# Patient Record
Sex: MALE | Race: OTHER | NOT HISPANIC OR LATINO
[De-identification: names, ages, dates, MRNs, and addresses within clinical notes are randomized per-mention and may not be internally consistent; named-entity substitution may affect disease eponyms.]

---

## 2019-12-18 ENCOUNTER — RX RENEWAL (OUTPATIENT)
Age: 84
End: 2019-12-18

## 2019-12-18 PROBLEM — Z00.00 ENCOUNTER FOR PREVENTIVE HEALTH EXAMINATION: Status: ACTIVE | Noted: 2019-12-18

## 2019-12-19 ENCOUNTER — RECORD ABSTRACTING (OUTPATIENT)
Age: 84
End: 2019-12-19

## 2019-12-19 DIAGNOSIS — Z78.9 OTHER SPECIFIED HEALTH STATUS: ICD-10-CM

## 2019-12-19 DIAGNOSIS — Z83.3 FAMILY HISTORY OF DIABETES MELLITUS: ICD-10-CM

## 2019-12-19 DIAGNOSIS — I10 ESSENTIAL (PRIMARY) HYPERTENSION: ICD-10-CM

## 2019-12-19 DIAGNOSIS — Z80.0 FAMILY HISTORY OF MALIGNANT NEOPLASM OF DIGESTIVE ORGANS: ICD-10-CM

## 2019-12-19 DIAGNOSIS — E03.9 HYPOTHYROIDISM, UNSPECIFIED: ICD-10-CM

## 2019-12-19 DIAGNOSIS — Z82.49 FAMILY HISTORY OF ISCHEMIC HEART DISEASE AND OTHER DISEASES OF THE CIRCULATORY SYSTEM: ICD-10-CM

## 2019-12-19 LAB
PSA FREE FLD-MCNC: 20.5
PSA FREE SERPL-MCNC: 1.72
PSA SERPL-MCNC: 8.4

## 2019-12-19 RX ORDER — AMLODIPINE BESYLATE 5 MG/1
TABLET ORAL
Refills: 0 | Status: ACTIVE | COMMUNITY

## 2019-12-19 RX ORDER — LISINOPRIL 30 MG/1
TABLET ORAL
Refills: 0 | Status: ACTIVE | COMMUNITY

## 2019-12-19 RX ORDER — LEVOTHYROXINE SODIUM 137 UG/1
TABLET ORAL
Refills: 0 | Status: ACTIVE | COMMUNITY

## 2020-06-30 ENCOUNTER — RX RENEWAL (OUTPATIENT)
Age: 85
End: 2020-06-30

## 2020-07-02 ENCOUNTER — APPOINTMENT (OUTPATIENT)
Dept: UROLOGY | Facility: CLINIC | Age: 85
End: 2020-07-02
Payer: MEDICARE

## 2020-07-02 DIAGNOSIS — R33.8 OTHER RETENTION OF URINE: ICD-10-CM

## 2020-07-02 DIAGNOSIS — Z86.19 PERSONAL HISTORY OF OTHER INFECTIOUS AND PARASITIC DISEASES: ICD-10-CM

## 2020-07-02 DIAGNOSIS — Z87.898 PERSONAL HISTORY OF OTHER SPECIFIED CONDITIONS: ICD-10-CM

## 2020-07-02 DIAGNOSIS — G47.30 SLEEP APNEA, UNSPECIFIED: ICD-10-CM

## 2020-07-02 DIAGNOSIS — Z86.711 PERSONAL HISTORY OF PULMONARY EMBOLISM: ICD-10-CM

## 2020-07-02 LAB
BILIRUB UR QL STRIP: NORMAL
CLARITY UR: CLEAR
COLLECTION METHOD: NORMAL
GLUCOSE UR-MCNC: NORMAL
HCG UR QL: NORMAL EU/DL
HGB UR QL STRIP.AUTO: NORMAL
KETONES UR-MCNC: NORMAL
LEUKOCYTE ESTERASE UR QL STRIP: NORMAL
NITRITE UR QL STRIP: NORMAL
PH UR STRIP: 5.5
PROT UR STRIP-MCNC: NORMAL
SP GR UR STRIP: 1.02

## 2020-07-02 PROCEDURE — 76857 US EXAM PELVIC LIMITED: CPT

## 2020-07-02 PROCEDURE — 81003 URINALYSIS AUTO W/O SCOPE: CPT | Mod: QW

## 2020-07-02 PROCEDURE — 0: CUSTOM

## 2020-07-02 PROCEDURE — 99215 OFFICE O/P EST HI 40 MIN: CPT | Mod: 25

## 2020-07-02 RX ORDER — APIXABAN 5 MG/1
TABLET, FILM COATED ORAL
Refills: 0 | Status: DISCONTINUED | COMMUNITY
End: 2020-07-02

## 2020-07-02 RX ORDER — ASPIRIN 81 MG
81 TABLET, DELAYED RELEASE (ENTERIC COATED) ORAL
Refills: 0 | Status: ACTIVE | COMMUNITY

## 2020-07-02 NOTE — ASSESSMENT
[FreeTextEntry1] : I discussed the findings and options with Mr. DIANA BOYLE in detail. He will continue with the combination therapy for his voiding symptoms.\par Regarding the ED, he will continue with either Levitra 20mg, Cialis 20mg or re-try Viagra 100mg, independently as he does not want additional intervention.\par \par I would like to see Mr. Boyle, electively, in one year (bladder sono).\par

## 2020-07-02 NOTE — PHYSICAL EXAM
[General Appearance - Well Developed] : well developed [General Appearance - Well Nourished] : well nourished [Normal Appearance] : normal appearance [General Appearance - In No Acute Distress] : no acute distress [Well Groomed] : well groomed [Costovertebral Angle Tenderness] : no ~M costovertebral angle tenderness [Abdomen Soft] : soft [Abdomen Tenderness] : non-tender [Urinary Bladder Findings] : the bladder was normal on palpation [Urethral Meatus] : meatus normal [Testes Mass (___cm)] : there were no testicular masses [Scrotum] : the scrotum was normal [No Prostate Nodules] : no prostate nodules [Skin Color & Pigmentation] : normal skin color and pigmentation [Edema] : no peripheral edema [] : no respiratory distress [Exaggerated Use Of Accessory Muscles For Inspiration] : no accessory muscle use [Oriented To Time, Place, And Person] : oriented to person, place, and time [Respiration, Rhythm And Depth] : normal respiratory rhythm and effort [Mood] : the mood was normal [Affect] : the affect was normal [Normal Station and Gait] : the gait and station were normal for the patient's age [Not Anxious] : not anxious [No Focal Deficits] : no focal deficits [No Palpable Adenopathy] : no palpable adenopathy [Abdomen Mass (___ Cm)] : no abdominal mass palpated [Penis Abnormality] : normal circumcised penis [Epididymis] : the epididymides were normal [Prostate Tenderness] : the prostate was not tender [Testes Tenderness] : no tenderness of the testes [Heart Rate And Rhythm] : Heart rate and rhythm were normal [FreeTextEntry1] : Diffuse angiokeratomas and seborrheic keratoses

## 2020-07-02 NOTE — HISTORY OF PRESENT ILLNESS
[FreeTextEntry1] : Mr. DIANA BOYLE comes in today for his urologic follow-up.  He presents with longstanding moderate chronic lower urinary tract symptoms (obstructive and irritative) with nocturia x1. He has had infrequent episodes of urge incontinence.  He is continuing on Uroxatrol and Proscar.  Mr. Boyle experienced urinary retention after having an inguinal herniorrhaphy in October 2018 and was managed with clean intermittent catheterization for several months.\par IPSS: 7/35\par Sono: 24cc PVR; 125cc prostate\par \par Mr. Boyle a long history of erectile dysfunction managed with Levitra 20 mg or Cialis 20 mg which he uses independently, achieving a suboptimal response. 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

## 2020-07-02 NOTE — LETTER BODY
[Dear  ___] : Dear  [unfilled], [Please see my note below.] : Please see my note below. [Consult Closing:] : Thank you very much for allowing me to participate in the care of this patient.  If you have any questions, please do not hesitate to contact me. [Sincerely,] : Sincerely, [FreeTextEntry3] : Padilla Fernandez MD, FACS\par

## 2020-12-28 ENCOUNTER — NON-APPOINTMENT (OUTPATIENT)
Age: 85
End: 2020-12-28

## 2021-01-21 ENCOUNTER — APPOINTMENT (OUTPATIENT)
Dept: UROLOGY | Facility: CLINIC | Age: 86
End: 2021-01-21
Payer: MEDICARE

## 2021-01-21 VITALS
TEMPERATURE: 97.6 F | HEIGHT: 67 IN | SYSTOLIC BLOOD PRESSURE: 120 MMHG | DIASTOLIC BLOOD PRESSURE: 77 MMHG | HEART RATE: 77 BPM | BODY MASS INDEX: 27.47 KG/M2 | OXYGEN SATURATION: 98 % | WEIGHT: 175 LBS

## 2021-01-21 LAB
BILIRUB UR QL STRIP: NORMAL
CLARITY UR: CLEAR
COLLECTION METHOD: NORMAL
GLUCOSE UR-MCNC: NORMAL
HCG UR QL: 0.2 EU/DL
HGB UR QL STRIP.AUTO: NORMAL
KETONES UR-MCNC: NORMAL
LEUKOCYTE ESTERASE UR QL STRIP: NORMAL
NITRITE UR QL STRIP: NORMAL
PH UR STRIP: 6.5
PROT UR STRIP-MCNC: NORMAL
SP GR UR STRIP: 1.02

## 2021-01-21 PROCEDURE — 99215 OFFICE O/P EST HI 40 MIN: CPT | Mod: 25

## 2021-01-21 PROCEDURE — 81003 URINALYSIS AUTO W/O SCOPE: CPT | Mod: QW

## 2021-01-21 PROCEDURE — 76857 US EXAM PELVIC LIMITED: CPT

## 2021-01-21 PROCEDURE — 99072 ADDL SUPL MATRL&STAF TM PHE: CPT

## 2021-01-21 RX ORDER — SILDENAFIL 100 MG/1
100 TABLET, FILM COATED ORAL
Qty: 10 | Refills: 3 | Status: ACTIVE | COMMUNITY
Start: 2021-01-21 | End: 1900-01-01

## 2021-01-21 RX ORDER — VARDENAFIL 20 MG/1
20 TABLET, FILM COATED ORAL
Qty: 10 | Refills: 6 | Status: DISCONTINUED | COMMUNITY
End: 2021-01-21

## 2021-01-21 RX ORDER — TADALAFIL 20 MG/1
20 TABLET ORAL
Qty: 10 | Refills: 5 | Status: DISCONTINUED | COMMUNITY
End: 2021-01-21

## 2021-01-21 NOTE — ASSESSMENT
[FreeTextEntry1] : I discussed the findings and options with Mr. DIANA BOYLE in detail. He will continue with alfuzosin and finasteride.\par \par Regarding the ED, he will try Viagra 100-150mg. I counselled him regarding its use and side effects.\par \par Providing there are no new problems, I look forward to seeing Mr. Boyle in one year (bladder sono). \par

## 2021-01-21 NOTE — PHYSICAL EXAM
[General Appearance - Well Developed] : well developed [General Appearance - Well Nourished] : well nourished [Normal Appearance] : normal appearance [Well Groomed] : well groomed [General Appearance - In No Acute Distress] : no acute distress [Abdomen Soft] : soft [Abdomen Tenderness] : non-tender [Costovertebral Angle Tenderness] : no ~M costovertebral angle tenderness [Abdomen Mass (___ Cm)] : no abdominal mass palpated [Urethral Meatus] : meatus normal [Penis Abnormality] : normal circumcised penis [Urinary Bladder Findings] : the bladder was normal on palpation [Scrotum] : the scrotum was normal [Epididymis] : the epididymides were normal [Testes Tenderness] : no tenderness of the testes [Prostate Tenderness] : the prostate was not tender [Testes Mass (___cm)] : there were no testicular masses [No Prostate Nodules] : no prostate nodules [Skin Color & Pigmentation] : normal skin color and pigmentation [Heart Rate And Rhythm] : Heart rate and rhythm were normal [Edema] : no peripheral edema [] : no respiratory distress [Respiration, Rhythm And Depth] : normal respiratory rhythm and effort [Exaggerated Use Of Accessory Muscles For Inspiration] : no accessory muscle use [Oriented To Time, Place, And Person] : oriented to person, place, and time [Affect] : the affect was normal [Mood] : the mood was normal [Not Anxious] : not anxious [Normal Station and Gait] : the gait and station were normal for the patient's age [No Focal Deficits] : no focal deficits [No Palpable Adenopathy] : no palpable adenopathy [FreeTextEntry1] : Diffuse angiokeratomas and seborrheic keratoses

## 2021-01-21 NOTE — HISTORY OF PRESENT ILLNESS
[FreeTextEntry1] : Mr. DIANA BOYLE comes in today for follow-up.  He presents with longstanding moderate chronic lower urinary tract symptoms (obstructive and irritative) with nocturia x1. He has also had infrequent episodes of urge incontinence.  He is continuing on Uroxatral and Proscar.  Mr. Boyle experienced urinary retention after having an inguinal herniorrhaphy in October 2018, which was managed with clean intermittent catheterization for several months. \par IPSS: 4/35\par Sono:  53cc PVR; 90cc prostate\par \par Mr. Boyle also has a long history of erectile dysfunction managed with Levitra 20 mg or Cialis 20 mg which he uses independently, with a suboptimal response. He has also noted decreased libido.\par \par PSAs: 6/17/15--8.4 (20%); 6/16/14--7.61 (23%); 6/24/13--9.27; 11/12/12--18.56; 11/17/11--7.89\par \par Prostate bx:  11/28/17--PIN; 1/27/13--BPH; 8/26/10--BPH; 10/17/08--BPH; 9/4/02--PIN

## 2021-01-21 NOTE — ADDENDUM
[FreeTextEntry1] : A portion of this note was written by [Titi Bañuelos] on 01/15/2021 acting as a scribe for Dr. Fernandez. \par \par I have personally reviewed the chart and agree that the record accurately reflects my personal performance of the history, physical exam, assessment, and plan.

## 2021-03-12 ENCOUNTER — LABORATORY RESULT (OUTPATIENT)
Age: 86
End: 2021-03-12

## 2021-03-12 ENCOUNTER — APPOINTMENT (OUTPATIENT)
Dept: UROLOGY | Facility: CLINIC | Age: 86
End: 2021-03-12
Payer: MEDICARE

## 2021-03-12 VITALS
OXYGEN SATURATION: 93 % | BODY MASS INDEX: 27.47 KG/M2 | TEMPERATURE: 98.4 F | HEIGHT: 67 IN | DIASTOLIC BLOOD PRESSURE: 69 MMHG | HEART RATE: 75 BPM | SYSTOLIC BLOOD PRESSURE: 132 MMHG | WEIGHT: 175 LBS

## 2021-03-12 LAB
BILIRUB UR QL STRIP: NORMAL
CLARITY UR: CLEAR
COLLECTION METHOD: NORMAL
GLUCOSE UR-MCNC: NORMAL
HCG UR QL: 0.2 EU/DL
HGB UR QL STRIP.AUTO: NORMAL
KETONES UR-MCNC: NORMAL
LEUKOCYTE ESTERASE UR QL STRIP: NORMAL
NITRITE UR QL STRIP: NORMAL
PH UR STRIP: 5.5
PROT UR STRIP-MCNC: NORMAL
SP GR UR STRIP: 1.03

## 2021-03-12 PROCEDURE — 99214 OFFICE O/P EST MOD 30 MIN: CPT

## 2021-03-12 PROCEDURE — 81003 URINALYSIS AUTO W/O SCOPE: CPT | Mod: QW

## 2021-03-12 PROCEDURE — 99072 ADDL SUPL MATRL&STAF TM PHE: CPT

## 2021-03-12 NOTE — PHYSICAL EXAM
[General Appearance - Well Developed] : well developed [General Appearance - Well Nourished] : well nourished [Normal Appearance] : normal appearance [Well Groomed] : well groomed [General Appearance - In No Acute Distress] : no acute distress [Abdomen Soft] : soft [Abdomen Tenderness] : non-tender [Abdomen Mass (___ Cm)] : no abdominal mass palpated [Costovertebral Angle Tenderness] : no ~M costovertebral angle tenderness [Urethral Meatus] : meatus normal [Penis Abnormality] : normal circumcised penis [Urinary Bladder Findings] : the bladder was normal on palpation [Scrotum] : the scrotum was normal [Epididymis] : the epididymides were normal [Testes Tenderness] : no tenderness of the testes [Testes Mass (___cm)] : there were no testicular masses [Skin Color & Pigmentation] : normal skin color and pigmentation [Heart Rate And Rhythm] : Heart rate and rhythm were normal [Edema] : no peripheral edema [] : no respiratory distress [Respiration, Rhythm And Depth] : normal respiratory rhythm and effort [Exaggerated Use Of Accessory Muscles For Inspiration] : no accessory muscle use [Oriented To Time, Place, And Person] : oriented to person, place, and time [Affect] : the affect was normal [Mood] : the mood was normal [Not Anxious] : not anxious [Normal Station and Gait] : the gait and station were normal for the patient's age [No Focal Deficits] : no focal deficits [No Palpable Adenopathy] : no palpable adenopathy [FreeTextEntry1] : Diffuse angiokeratomas and seborrheic keratoses

## 2021-03-12 NOTE — ASSESSMENT
[FreeTextEntry1] : I discussed the findings and options with Mr. DIANA BOYLE in detail. 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  He will monitor this and call us if there is a recurrence.\par \par In the meantime, Mr. Boyle will continue with the combination therapy for his voiding symptoms and Viagra for the erectile dysfunction.\par \par I explained to him that the absent ejaculation is a consequence of the medications he is on as well as his age, and that there is no effective intervention for this.\par \par Providing there are no new problems, I look forward to seeing Mr. Boyle in one year (bladder sono). \par

## 2021-03-12 NOTE — ADDENDUM
[FreeTextEntry1] : A portion of this note was written by [Titi Bañuelos] on 03/11/2021 acting as a scribe for Dr. Fernanedz. \par \par I have personally reviewed the chart and agree that the record accurately reflects my personal performance of the history, physical exam, assessment, and plan.

## 2021-03-12 NOTE — HISTORY OF PRESENT ILLNESS
[FreeTextEntry1] : Mr. DIANA BOYLE comes in emergently today for follow-up noting blood on his underpants yesterday.  He denies any hematuria or hematochezia.  Additionally, there has not been any change in his voiding symptoms.\par \par From his general urologic history, Mr. Boyle reports longstanding moderate chronic lower urinary tract symptoms (obstructive and irritative) with nocturia x1. He has also had infrequent episodes of urge incontinence.  He is continuing on Uroxatral and Proscar.  \par IPSS: 5/35\par Sono: 18cc PVR; Prostate 90cc\par \par Mr. Boyle experienced urinary retention after having an inguinal herniorrhaphy in October 2018, which was managed with clean intermittent catheterization for several months. \par \par He has also a long history of erectile dysfunction. He has tried several PDE 5 inhibitors with a suboptimal response.  On his last visit I gave him Viagra 100mg which she has not tried.  He has also noted decreased libido and negligible ejaculatory volume.\par \par PSAs: 6/17/15--8.4 (20%); 6/16/14--7.61 (23%); 6/24/13--9.27; 11/12/12--18.56; 11/17/11--7.89\par \par Prostate bx:  11/28/17--PIN; 1/27/13--BPH; 8/26/10--BPH; 10/17/08--BPH; 9/4/02--PIN

## 2021-03-18 ENCOUNTER — NON-APPOINTMENT (OUTPATIENT)
Age: 86
End: 2021-03-18

## 2021-04-02 LAB
BACTERIA UR CULT: NORMAL
URINE CYTOLOGY: NORMAL

## 2021-04-08 ENCOUNTER — NON-APPOINTMENT (OUTPATIENT)
Age: 86
End: 2021-04-08

## 2021-06-18 ENCOUNTER — APPOINTMENT (OUTPATIENT)
Dept: UROLOGY | Facility: CLINIC | Age: 86
End: 2021-06-18
Payer: MEDICARE

## 2021-06-18 VITALS
DIASTOLIC BLOOD PRESSURE: 80 MMHG | WEIGHT: 175 LBS | HEIGHT: 67 IN | TEMPERATURE: 97.5 F | SYSTOLIC BLOOD PRESSURE: 146 MMHG | BODY MASS INDEX: 27.47 KG/M2 | HEART RATE: 68 BPM

## 2021-06-18 LAB
BILIRUB UR QL STRIP: NORMAL
CLARITY UR: CLEAR
COLLECTION METHOD: NORMAL
GLUCOSE UR-MCNC: NORMAL
HCG UR QL: 0.2 EU/DL
HGB UR QL STRIP.AUTO: NORMAL
KETONES UR-MCNC: NORMAL
LEUKOCYTE ESTERASE UR QL STRIP: NORMAL
NITRITE UR QL STRIP: NORMAL
PH UR STRIP: 5.5
PROT UR STRIP-MCNC: NORMAL
SP GR UR STRIP: 1.02

## 2021-06-18 PROCEDURE — 76857 US EXAM PELVIC LIMITED: CPT

## 2021-06-18 PROCEDURE — 99215 OFFICE O/P EST HI 40 MIN: CPT

## 2021-06-18 PROCEDURE — 99072 ADDL SUPL MATRL&STAF TM PHE: CPT

## 2021-06-18 RX ORDER — CIPROFLOXACIN HYDROCHLORIDE 500 MG/1
500 TABLET, FILM COATED ORAL TWICE DAILY
Qty: 10 | Refills: 0 | Status: DISCONTINUED | COMMUNITY
Start: 2021-03-18 | End: 2021-06-18

## 2021-06-18 NOTE — ADDENDUM
[FreeTextEntry1] : A portion of this note was written by [Titi Bañuelos] on 06/17/2021 acting as a scribe for Dr. Fernandez. \par \par I have personally reviewed the chart and agree that the record accurately reflects my personal performance of the history, physical exam, assessment, and plan.

## 2021-06-18 NOTE — HISTORY OF PRESENT ILLNESS
[FreeTextEntry1] : Mr. DIANA BOYLE comes in today for his urologic follow-up. He reports longstanding moderate stable chronic lower urinary tract symptoms (obstructive and irritative) with nocturia x 1. He has also had infrequent episodes of urge incontinence.  He is continuing on Uroxatral and Proscar.  \par IPSS: 8/35\par Sono: 19cc PVR; 112cc prostate\par \par Mr. Boyle experienced urinary retention after having an inguinal herniorrhaphy in October 2018, which was managed with clean intermittent catheterization for several months. \par \par He has also a long history of erectile dysfunction. He has tried several PDE 5 inhibitors with a suboptimal response.  He still has Viagra 100mg, which he has not tried.  He has also continues to experience decreased libido and negligible ejaculatory volume.\par \par PSAs: 6/17/15--8.4 (20%); 6/16/14--7.61 (23%); 6/24/13--9.27; 11/12/12--18.56; 11/17/11--7.89\par \par Prostate bx:  11/28/17--PIN; 1/27/13--BPH; 8/26/10--BPH; 10/17/08--BPH; 9/4/02--PIN

## 2021-06-18 NOTE — ASSESSMENT
[FreeTextEntry1] : I discussed the findings and options with Mr. DIANA BOYLE in detail. He will continue with combination therapy for his voiding symptoms the Viagra for ED prn.\par \par Providing there are no new problems, I look forward to seeing Mr. Boyle in one year (bladder sono). \par

## 2021-06-18 NOTE — PHYSICAL EXAM
[General Appearance - Well Developed] : well developed [General Appearance - Well Nourished] : well nourished [Normal Appearance] : normal appearance [Well Groomed] : well groomed [General Appearance - In No Acute Distress] : no acute distress [Abdomen Soft] : soft [Abdomen Tenderness] : non-tender [Abdomen Mass (___ Cm)] : no abdominal mass palpated [Costovertebral Angle Tenderness] : no ~M costovertebral angle tenderness [Urethral Meatus] : meatus normal [Penis Abnormality] : normal circumcised penis [Urinary Bladder Findings] : the bladder was normal on palpation [Scrotum] : the scrotum was normal [Epididymis] : the epididymides were normal [Testes Tenderness] : no tenderness of the testes [Testes Mass (___cm)] : there were no testicular masses [Skin Color & Pigmentation] : normal skin color and pigmentation [Heart Rate And Rhythm] : Heart rate and rhythm were normal [Edema] : no peripheral edema [] : no respiratory distress [Respiration, Rhythm And Depth] : normal respiratory rhythm and effort [Exaggerated Use Of Accessory Muscles For Inspiration] : no accessory muscle use [Oriented To Time, Place, And Person] : oriented to person, place, and time [Mood] : the mood was normal [Affect] : the affect was normal [Not Anxious] : not anxious [Normal Station and Gait] : the gait and station were normal for the patient's age [No Focal Deficits] : no focal deficits [No Palpable Adenopathy] : no palpable adenopathy [Prostate Tenderness] : the prostate was not tender [No Prostate Nodules] : no prostate nodules [FreeTextEntry1] : Diffuse angiokeratomas and seborrheic keratoses

## 2021-11-12 ENCOUNTER — NON-APPOINTMENT (OUTPATIENT)
Age: 86
End: 2021-11-12

## 2022-02-08 ENCOUNTER — APPOINTMENT (OUTPATIENT)
Dept: UROLOGY | Facility: CLINIC | Age: 87
End: 2022-02-08
Payer: MEDICARE

## 2022-02-08 PROCEDURE — 81003 URINALYSIS AUTO W/O SCOPE: CPT | Mod: QW

## 2022-02-08 PROCEDURE — 99214 OFFICE O/P EST MOD 30 MIN: CPT

## 2022-02-08 NOTE — PHYSICAL EXAM
[General Appearance - Well Developed] : well developed [General Appearance - Well Nourished] : well nourished [Normal Appearance] : normal appearance [Well Groomed] : well groomed [General Appearance - In No Acute Distress] : no acute distress [Abdomen Soft] : soft [Abdomen Tenderness] : non-tender [Abdomen Mass (___ Cm)] : no abdominal mass palpated [Costovertebral Angle Tenderness] : no ~M costovertebral angle tenderness [Urethral Meatus] : meatus normal [Penis Abnormality] : normal circumcised penis [Urinary Bladder Findings] : the bladder was normal on palpation [Scrotum] : the scrotum was normal [Epididymis] : the epididymides were normal [Testes Tenderness] : no tenderness of the testes [Testes Mass (___cm)] : there were no testicular masses [Prostate Tenderness] : the prostate was not tender [No Prostate Nodules] : no prostate nodules [Skin Color & Pigmentation] : normal skin color and pigmentation [Heart Rate And Rhythm] : Heart rate and rhythm were normal [Edema] : no peripheral edema [] : no respiratory distress [Respiration, Rhythm And Depth] : normal respiratory rhythm and effort [Exaggerated Use Of Accessory Muscles For Inspiration] : no accessory muscle use [Oriented To Time, Place, And Person] : oriented to person, place, and time [Affect] : the affect was normal [Mood] : the mood was normal [Not Anxious] : not anxious [Normal Station and Gait] : the gait and station were normal for the patient's age [No Focal Deficits] : no focal deficits [No Palpable Adenopathy] : no palpable adenopathy [FreeTextEntry1] : Diffuse angiokeratomas and seborrheic keratoses

## 2022-02-08 NOTE — HISTORY OF PRESENT ILLNESS
[FreeTextEntry1] : Mr. DIANA BOYLE comes in today for his urologic follow-up. He reports longstanding moderate stable chronic lower urinary tract symptoms (obstructive and irritative) with nocturia x 1. He has also had infrequent episodes of urge incontinence.  He is continuing on Uroxatral and Proscar.  \par IPSS: 10/35\par Sono:  26cc PVR; 146cc prostate\par \par Mr. Boyle experienced urinary retention after having an inguinal herniorrhaphy in October 2018, which was managed with clean intermittent catheterization for several months. \par \par He has also a long history of erectile dysfunction. He has tried several PDE 5 inhibitors with a suboptimal response.  He still has Viagra 100mg, which he has not tried.  He has also continues to experience decreased libido and negligible ejaculatory volume.\par \par PSAs: 6/17/15--8.4 (20%); 6/16/14--7.61 (23%); 6/24/13--9.27; 11/12/12--18.56; 11/17/11--7.89\par \par Prostate bx:  11/28/17--PIN; 1/27/13--BPH; 8/26/10--BPH; 10/17/08--BPH; 9/4/02--PIN

## 2022-02-08 NOTE — ASSESSMENT
[FreeTextEntry1] : I discussed the findings and options with Mr. DIANA BOYLE in detail. He will continue with combination therapy for his voiding symptoms the Viagra for ED prn.\par \par The right inguinal and umbilical hernias have increased in size although he has remained asymptomatic.  He is contemplating surgery for these.\par \par Providing there are no new problems, I look forward to seeing Mr. Boyle in one year (bladder sono). \par

## 2022-02-08 NOTE — ADDENDUM
[FreeTextEntry1] : A portion of this note was written by [Titi Bañuelos] on 02/07/2022 acting as a scribe for Dr. Fernandez. \par \par I have personally reviewed the chart and agree that the record accurately reflects my personal performance of the history, physical exam, assessment, and plan.

## 2022-08-30 ENCOUNTER — APPOINTMENT (OUTPATIENT)
Dept: UROLOGY | Facility: CLINIC | Age: 87
End: 2022-08-30

## 2022-08-30 VITALS
HEART RATE: 60 BPM | WEIGHT: 173 LBS | DIASTOLIC BLOOD PRESSURE: 75 MMHG | OXYGEN SATURATION: 96 % | BODY MASS INDEX: 27.47 KG/M2 | TEMPERATURE: 98.2 F | HEIGHT: 66.5 IN | SYSTOLIC BLOOD PRESSURE: 118 MMHG

## 2022-08-30 DIAGNOSIS — Z87.440 PERSONAL HISTORY OF URINARY (TRACT) INFECTIONS: ICD-10-CM

## 2022-08-30 PROCEDURE — 76857 US EXAM PELVIC LIMITED: CPT

## 2022-08-30 NOTE — ADDENDUM
[FreeTextEntry1] : A portion of this note was written by [Titi Bañuelos] on 08/29/2022 acting as a scribe for Dr. Fernandez. \par \par I have personally reviewed the chart and agree that the record accurately reflects my personal performance of the history, physical exam, assessment, and plan.

## 2022-08-30 NOTE — PHYSICAL EXAM
[General Appearance - Well Developed] : well developed [General Appearance - Well Nourished] : well nourished [Normal Appearance] : normal appearance [Well Groomed] : well groomed [General Appearance - In No Acute Distress] : no acute distress [Abdomen Soft] : soft [Abdomen Tenderness] : non-tender [Abdomen Mass (___ Cm)] : no abdominal mass palpated [Costovertebral Angle Tenderness] : no ~M costovertebral angle tenderness [Urethral Meatus] : meatus normal [Penis Abnormality] : normal circumcised penis [Urinary Bladder Findings] : the bladder was normal on palpation [Scrotum] : the scrotum was normal [Epididymis] : the epididymides were normal [Testes Tenderness] : no tenderness of the testes [Testes Mass (___cm)] : there were no testicular masses [Prostate Tenderness] : the prostate was not tender [No Prostate Nodules] : no prostate nodules [Skin Color & Pigmentation] : normal skin color and pigmentation [Heart Rate And Rhythm] : Heart rate and rhythm were normal [Edema] : no peripheral edema [] : no respiratory distress [Respiration, Rhythm And Depth] : normal respiratory rhythm and effort [Exaggerated Use Of Accessory Muscles For Inspiration] : no accessory muscle use [Oriented To Time, Place, And Person] : oriented to person, place, and time [Affect] : the affect was normal [Mood] : the mood was normal [Not Anxious] : not anxious [Normal Station and Gait] : the gait and station were normal for the patient's age [No Focal Deficits] : no focal deficits [No Palpable Adenopathy] : no palpable adenopathy [Abdomen Hernia] : no hernia was discovered [FreeTextEntry1] : Diffuse angiokeratomas and seborrheic keratoses

## 2022-08-30 NOTE — HISTORY OF PRESENT ILLNESS
[FreeTextEntry1] : Mr. DIANA BOYLE comes in today for his urologic follow-up. He underwent right inguinal and umbilical herniorrhaphies in early April 2022.  Postoperatively he developed, what he refers to as, significant scrotal swelling which has improved markedly.  \par \par From his general urologic history Mr. Boyle reports longstanding moderate stable chronic lower urinary tract symptoms (obstructive and irritative) with nocturia x 1. He has also had infrequent episodes of urge incontinence.  He is continuing on Uroxatral and Proscar and is satisfied with this approach.\par IPSS: 7\par Sono: PVR 9cc, 109 cc prostate\par \par Mr. Boyle experienced urinary retention after having an inguinal herniorrhaphy in October 2018, which was managed with clean intermittent catheterization for several months. \par \par He has also a long history of erectile dysfunction. He has tried several PDE 5 inhibitors with a suboptimal response.  He still has Viagra 100mg, which he has not tried.  He has also continues to experience decreased libido and negligible ejaculatory volume.\par \par PSAs: 6/17/15--8.4 (20%); 6/16/14--7.61 (23%); 6/24/13--9.27; 11/12/12--18.56; 11/17/11--7.89\par \par Prostate bx:  11/28/17--PIN; 1/27/13--BPH; 8/26/10--BPH; 10/17/08--BPH; 9/4/02--PIN\par \par Scrotal US: 7/21/22--Left varicocele. Bilateral hydroceles. 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

## 2023-01-29 ENCOUNTER — RX RENEWAL (OUTPATIENT)
Age: 88
End: 2023-01-29

## 2023-02-06 ENCOUNTER — APPOINTMENT (OUTPATIENT)
Dept: UROLOGY | Facility: CLINIC | Age: 88
End: 2023-02-06
Payer: MEDICARE

## 2023-02-06 VITALS
OXYGEN SATURATION: 98 % | HEART RATE: 68 BPM | TEMPERATURE: 97.1 F | DIASTOLIC BLOOD PRESSURE: 74 MMHG | SYSTOLIC BLOOD PRESSURE: 131 MMHG

## 2023-02-06 DIAGNOSIS — Z87.19 PERSONAL HISTORY OF OTHER DISEASES OF THE DIGESTIVE SYSTEM: ICD-10-CM

## 2023-02-06 PROCEDURE — 51798 US URINE CAPACITY MEASURE: CPT

## 2023-02-06 PROCEDURE — 99215 OFFICE O/P EST HI 40 MIN: CPT

## 2023-02-06 NOTE — HISTORY OF PRESENT ILLNESS
[FreeTextEntry1] : Mr. DIANA BOYLE comes in today for his urologic follow-up. He reports longstanding moderate stable chronic lower urinary tract symptoms (obstructive and irritative) with nocturia x 1. He has also had infrequent episodes of urge incontinence.  He is continuing on Uroxatral and Proscar and is satisfied with this approach.\par IPSS: 7/35\par Sono (performed to assess bladder emptying): 12cc PVR\par \par He has also a long history of erectile dysfunction. He has tried several PDE 5 inhibitors with a suboptimal response, including Viagra 100mg. He has also continues to experience decreased libido and negligible ejaculatory volume.\par \par Mr. Boyle experienced urinary retention after having an inguinal herniorrhaphy in October 2018, which was managed with clean intermittent catheterization for several months. \par \par He underwent right inguinal and umbilical herniorrhaphies in early April 2022.  Postoperatively he developed, what he refers to as, significant scrotal swelling which has resolved.\par \par PSAs: 6/17/15--8.4 (20%); 6/16/14--7.61 (23%); 6/24/13--9.27; 11/12/12--18.56; 11/17/11--7.89\par \par Prostate bx:  11/28/17--PIN; 1/27/13--BPH; 8/26/10--BPH; 10/17/08--BPH; 9/4/02--PIN\par \par Scrotal US: 7/21/22--Left varicocele. Bilateral hydroceles. 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

## 2023-02-06 NOTE — PHYSICAL EXAM
[General Appearance - Well Developed] : well developed [General Appearance - Well Nourished] : well nourished [Normal Appearance] : normal appearance [Well Groomed] : well groomed [General Appearance - In No Acute Distress] : no acute distress [Abdomen Soft] : soft [Abdomen Tenderness] : non-tender [Abdomen Mass (___ Cm)] : no abdominal mass palpated [Abdomen Hernia] : no hernia was discovered [Costovertebral Angle Tenderness] : no ~M costovertebral angle tenderness [Urethral Meatus] : meatus normal [Penis Abnormality] : normal circumcised penis [Urinary Bladder Findings] : the bladder was normal on palpation [Scrotum] : the scrotum was normal [Epididymis] : the epididymides were normal [Testes Tenderness] : no tenderness of the testes [Testes Mass (___cm)] : there were no testicular masses [Prostate Tenderness] : the prostate was not tender [No Prostate Nodules] : no prostate nodules [Skin Color & Pigmentation] : normal skin color and pigmentation [Edema] : no peripheral edema [Heart Rate And Rhythm] : Heart rate and rhythm were normal [] : no respiratory distress [Respiration, Rhythm And Depth] : normal respiratory rhythm and effort [Exaggerated Use Of Accessory Muscles For Inspiration] : no accessory muscle use [Oriented To Time, Place, And Person] : oriented to person, place, and time [Affect] : the affect was normal [Mood] : the mood was normal [Not Anxious] : not anxious [Normal Station and Gait] : the gait and station were normal for the patient's age [No Focal Deficits] : no focal deficits [No Palpable Adenopathy] : no palpable adenopathy [FreeTextEntry1] : Diffuse angiokeratomas and seborrheic keratoses

## 2023-02-06 NOTE — ADDENDUM
[FreeTextEntry1] : A portion of this note was written by [Titi Bañuelos] on 02/03/2023 acting as a scribe for Dr. Fernandez. \par \par I have personally reviewed the chart and agree that the record accurately reflects my personal performance of the history, physical exam, assessment, and plan.

## 2023-02-06 NOTE — ASSESSMENT
[FreeTextEntry1] : I discussed the findings and options with Mr. DIANA BOYLE in detail. He will continue with combination therapy for his voiding symptoms the Viagra for ED prn.  He is not interested in pursuing invasive treatment for the ED (i.e. penile injection therapy or a penile prosthesis.\par \par Providing there are no new problems, I look forward to seeing Mr. Boyle in one year (bladder sono). \par

## 2024-02-02 PROBLEM — R35.1 NOCTURIA: Status: ACTIVE | Noted: 2020-07-02

## 2024-02-02 PROBLEM — N52.9 ERECTILE DYSFUNCTION: Status: ACTIVE | Noted: 2019-12-19

## 2024-02-02 PROBLEM — N39.41 URGE INCONTINENCE: Status: ACTIVE | Noted: 2020-07-02

## 2024-02-02 PROBLEM — R39.9 LOWER URINARY TRACT SYMPTOMS (LUTS): Status: ACTIVE | Noted: 2019-12-18

## 2024-02-05 ENCOUNTER — APPOINTMENT (OUTPATIENT)
Dept: UROLOGY | Facility: CLINIC | Age: 89
End: 2024-02-05
Payer: MEDICARE

## 2024-02-05 DIAGNOSIS — R39.9 UNSPECIFIED SYMPTOMS AND SIGNS INVOLVING THE GENITOURINARY SYSTEM: ICD-10-CM

## 2024-02-05 DIAGNOSIS — N52.9 MALE ERECTILE DYSFUNCTION, UNSPECIFIED: ICD-10-CM

## 2024-02-05 DIAGNOSIS — N39.41 URGE INCONTINENCE: ICD-10-CM

## 2024-02-05 DIAGNOSIS — R35.1 NOCTURIA: ICD-10-CM

## 2024-02-05 PROCEDURE — 51798 US URINE CAPACITY MEASURE: CPT

## 2024-02-05 PROCEDURE — 99215 OFFICE O/P EST HI 40 MIN: CPT

## 2024-02-05 RX ORDER — SILDENAFIL 100 MG/1
100 TABLET, FILM COATED ORAL
Qty: 30 | Refills: 3 | Status: ACTIVE | COMMUNITY
Start: 1900-01-01 | End: 1900-01-01

## 2024-02-05 RX ORDER — FINASTERIDE 5 MG/1
5 TABLET, FILM COATED ORAL
Qty: 90 | Refills: 3 | Status: ACTIVE | COMMUNITY
Start: 2019-12-18 | End: 1900-01-01

## 2024-02-05 RX ORDER — ALFUZOSIN HYDROCHLORIDE 10 MG/1
10 TABLET, EXTENDED RELEASE ORAL
Qty: 90 | Refills: 3 | Status: ACTIVE | COMMUNITY
Start: 2019-12-18 | End: 1900-01-01

## 2024-02-05 NOTE — HISTORY OF PRESENT ILLNESS
[FreeTextEntry1] : Mr. DIANA BOYLE returns for his annual follow-up. He reports longstanding moderate stable chronic lower urinary tract symptoms (obstructive and irritative) with nocturia x 1. He has also had infrequent episodes of urge incontinence.  He is continuing on Uroxatral and Proscar and is satisfied with this approach. IPSS: 8/1 Sono (performed to assess bladder emptying): 3cc PVR  He has also a long history of erectile dysfunction. He has tried several PDE 5 inhibitors with a suboptimal response, including Viagra 100mg. He has also continues to experience decreased libido and negligible ejaculatory volume.  Mr. Boyle experienced urinary retention after having an inguinal herniorrhaphy in October 2018, which was managed with clean intermittent catheterization for several months.   He underwent right inguinal and umbilical herniorrhaphies in early April 2022.  Postoperatively he developed, what he refers to as, significant scrotal swelling which has resolved.  PSAs: 6/17/15--8.4 (20%); 6/16/14--7.61 (23%); 6/24/13--9.27; 11/12/12--18.56; 11/17/11--7.89  Prostate bx:  11/28/17--PIN; 1/27/13--BPH; 8/26/10--BPH; 10/17/08--BPH; 9/4/02--PIN  Scrotal US: 7/21/22--Left varicocele. Bilateral hydroceles. 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

## 2024-02-05 NOTE — ASSESSMENT
[FreeTextEntry1] : I discussed the findings and options with Mr. DIANA BOYLE in detail. He will continue with combination therapy for his voiding symptoms and Viagra for ED prn.  He is not interested in pursuing invasive treatment for the ED (i.e. penile injection therapy or a penile prosthesis).  Providing there are no new problems, I look forward to seeing Mr. Boyle in one year (bladder sono).

## 2024-02-05 NOTE — PHYSICAL EXAM
[General Appearance - Well Developed] : well developed [General Appearance - Well Nourished] : well nourished [Normal Appearance] : normal appearance [Well Groomed] : well groomed [General Appearance - In No Acute Distress] : no acute distress [Abdomen Soft] : soft [Abdomen Tenderness] : non-tender [Abdomen Mass (___ Cm)] : no abdominal mass palpated [Abdomen Hernia] : no hernia was discovered [Costovertebral Angle Tenderness] : no ~M costovertebral angle tenderness [Urethral Meatus] : meatus normal [Penis Abnormality] : normal circumcised penis [Urinary Bladder Findings] : the bladder was normal on palpation [Scrotum] : the scrotum was normal [Epididymis] : the epididymides were normal [Testes Tenderness] : no tenderness of the testes [Testes Mass (___cm)] : there were no testicular masses [Prostate Tenderness] : the prostate was not tender [No Prostate Nodules] : no prostate nodules [Skin Color & Pigmentation] : normal skin color and pigmentation [Heart Rate And Rhythm] : Heart rate and rhythm were normal [Edema] : no peripheral edema [] : no respiratory distress [Respiration, Rhythm And Depth] : normal respiratory rhythm and effort [Exaggerated Use Of Accessory Muscles For Inspiration] : no accessory muscle use [Affect] : the affect was normal [Oriented To Time, Place, And Person] : oriented to person, place, and time [Mood] : the mood was normal [Not Anxious] : not anxious [Normal Station and Gait] : the gait and station were normal for the patient's age [No Focal Deficits] : no focal deficits [No Palpable Adenopathy] : no palpable adenopathy [FreeTextEntry1] : Diffuse angiokeratomas and seborrheic keratoses

## 2024-05-13 RX ORDER — FINASTERIDE 5 MG/1
5 TABLET, FILM COATED ORAL
Qty: 90 | Refills: 3 | Status: ACTIVE | COMMUNITY
Start: 2024-05-13 | End: 1900-01-01

## 2024-08-07 ENCOUNTER — APPOINTMENT (OUTPATIENT)
Dept: ORTHOPEDIC SURGERY | Facility: CLINIC | Age: 89
End: 2024-08-07

## 2025-02-05 ENCOUNTER — APPOINTMENT (OUTPATIENT)
Dept: UROLOGY | Facility: CLINIC | Age: 89
End: 2025-02-05